# Patient Record
Sex: FEMALE | Race: WHITE | HISPANIC OR LATINO | Employment: FULL TIME | URBAN - METROPOLITAN AREA
[De-identification: names, ages, dates, MRNs, and addresses within clinical notes are randomized per-mention and may not be internally consistent; named-entity substitution may affect disease eponyms.]

---

## 2019-09-22 ENCOUNTER — TELEPHONE (OUTPATIENT)
Dept: SCHEDULING | Facility: IMAGING CENTER | Age: 24
End: 2019-09-22

## 2019-09-24 ENCOUNTER — TELEPHONE (OUTPATIENT)
Dept: MEDICAL GROUP | Facility: PHYSICIAN GROUP | Age: 24
End: 2019-09-24

## 2019-09-24 ENCOUNTER — OFFICE VISIT (OUTPATIENT)
Dept: MEDICAL GROUP | Facility: PHYSICIAN GROUP | Age: 24
End: 2019-09-24
Payer: COMMERCIAL

## 2019-09-24 ENCOUNTER — APPOINTMENT (OUTPATIENT)
Dept: MEDICAL GROUP | Facility: MEDICAL CENTER | Age: 24
End: 2019-09-24
Payer: COMMERCIAL

## 2019-09-24 VITALS
DIASTOLIC BLOOD PRESSURE: 64 MMHG | HEIGHT: 64 IN | BODY MASS INDEX: 32.44 KG/M2 | OXYGEN SATURATION: 97 % | HEART RATE: 98 BPM | TEMPERATURE: 98.3 F | WEIGHT: 190 LBS | SYSTOLIC BLOOD PRESSURE: 108 MMHG

## 2019-09-24 DIAGNOSIS — K59.00 CONSTIPATION, UNSPECIFIED CONSTIPATION TYPE: ICD-10-CM

## 2019-09-24 DIAGNOSIS — E66.9 CLASS 1 OBESITY WITH BODY MASS INDEX (BMI) OF 32.0 TO 32.9 IN ADULT, UNSPECIFIED OBESITY TYPE, UNSPECIFIED WHETHER SERIOUS COMORBIDITY PRESENT: ICD-10-CM

## 2019-09-24 DIAGNOSIS — E66.9 OBESITY (BMI 30-39.9): ICD-10-CM

## 2019-09-24 DIAGNOSIS — K21.9 GASTROESOPHAGEAL REFLUX DISEASE, ESOPHAGITIS PRESENCE NOT SPECIFIED: ICD-10-CM

## 2019-09-24 PROCEDURE — 99204 OFFICE O/P NEW MOD 45 MIN: CPT | Performed by: INTERNAL MEDICINE

## 2019-09-24 RX ORDER — POLYETHYLENE GLYCOL 3350 17 G/17G
17 POWDER, FOR SOLUTION ORAL DAILY
Qty: 1 EACH | Refills: 3 | Status: SHIPPED | OUTPATIENT
Start: 2019-09-24 | End: 2019-09-24

## 2019-09-24 RX ORDER — OMEPRAZOLE 20 MG/1
20 CAPSULE, DELAYED RELEASE ORAL DAILY
Qty: 30 CAP | Refills: 1 | Status: SHIPPED | OUTPATIENT
Start: 2019-09-24 | End: 2019-10-30

## 2019-09-24 RX ORDER — POLYETHYLENE GLYCOL 3350 17 G/17G
17 POWDER, FOR SOLUTION ORAL
Qty: 1 BOTTLE | Refills: 3 | Status: SHIPPED | OUTPATIENT
Start: 2019-09-24 | End: 2019-10-30

## 2019-09-24 ASSESSMENT — PATIENT HEALTH QUESTIONNAIRE - PHQ9: CLINICAL INTERPRETATION OF PHQ2 SCORE: 0

## 2019-09-24 NOTE — ASSESSMENT & PLAN NOTE
This is a new problem which patient states that its more than 3 years. She states that this is associated with bloating and mostly before food. Does have aggravating symptoms sometimes after dairy products. Pt denies any Nausea, no blood in stools.

## 2019-09-24 NOTE — PROGRESS NOTES
CC: Establish care with new PCP, stomach discomfort.    HISTORY OF PRESENT ILLNESS: Patient is a 23 y.o. female established patient who presents today to discuss on medical conditions as mentioned in HPI below.    Health Maintenance: Due for all the vaccines and healthcare maintenance, which patient refuses at this time.  Postponed to next visit.    GERD (gastroesophageal reflux disease)  This is a new problem which patient states that its more than 3 years. She states that this is associated with bloating and mostly before food. Does have aggravating symptoms sometimes after dairy products. Pt denies any Nausea, no blood in stools.    Constipation  This is a new problem which patient has on and off for few weeks which she tries to have BM once in 3 days.  Denies any diarrhea, no blood in the stools.  Does have left lower abdominal discomfort.      PHQ score 0, BMI within normal limits, no tobacco, no fall injuries.    Patient Active Problem List    Diagnosis Date Noted   • GERD (gastroesophageal reflux disease) 09/24/2019   • Constipation 09/24/2019   • Obesity (BMI 30-39.9) 09/24/2019      Allergies:Patient has no allergy information on record.    Current Outpatient Medications   Medication Sig Dispense Refill   • omeprazole (PRILOSEC) 20 MG delayed-release capsule Take 1 Cap by mouth every day. 30 Cap 1   • polyethylene glycol/lytes (MIRALAX) Pack Take 1 Packet by mouth every day. 1 Each 3     No current facility-administered medications for this visit.        Social History     Tobacco Use   • Smoking status: Light Tobacco Smoker   • Smokeless tobacco: Never Used   • Tobacco comment: once in a month    Substance Use Topics   • Alcohol use: Yes     Comment: occassionally once a week.   • Drug use: Never     Social History     Social History Narrative   • Not on file       Family History   Problem Relation Age of Onset   • Cancer Maternal Grandmother         ROS:     - Constitutional:  Negative for fever,  "chills, unexpected weight change, and fatigue/generalized weakness.    - HEENT:  Negative for headaches, vision changes, hearing changes, ear pain, ear discharge, rhinorrhea, sinus congestion, sore throat, and neck pain.      - Respiratory: Negative for cough, sputum production, chest congestion, dyspnea, wheezing, and crackles.      - Cardiovascular: Negative for chest pain, palpitations, orthopnea, and bilateral lower extremity edema.     - Gastrointestinal: As mentioned in HPI above     - Genitourinary: Negative for dysuria, polyuria, hematuria, pyuria, urinary urgency, and urinary incontinence.     - Musculoskeletal: Patient does have on and off left knee joint muscle cramps behind the knee as she sits in particular positions negative for myalgias, back pain.     - Skin: Negative for rash, itching, cyanotic skin color change.     - Neurological: Negative for dizziness, tingling, tremors, focal sensory deficit, focal weakness and headaches.     - Endo/Heme/Allergies: Does not bruise/bleed easily.     - Psychiatric/Behavioral: Negative for depression, suicidal/homicidal ideation and memory loss.      Last lab work never done for this patient, requesting all the baseline lab work.    Exam:    /64 (BP Location: Right arm, Patient Position: Sitting, BP Cuff Size: Adult)   Pulse 98   Temp 36.8 °C (98.3 °F) (Temporal)   Ht 1.626 m (5' 4\")   Wt 86.2 kg (190 lb)   SpO2 97%  Body mass index is 32.61 kg/m².    General:  Well nourished, well developed female in NAD  Head is grossly normal.  Neck: Supple without JVD or bruit. Thyroid is not enlarged.  Pulmonary: Clear to ausculation and percussion.  Normal effort. No rales, ronchi, or wheezing.  Cardiovascular: Regular rate and rhythm without murmur. Carotid and radial pulses are intact and equal bilaterally.  Extremities: no clubbing, cyanosis, or edema.  Abdominal exam : Soft, mild discomfort on palpation of the left lower quadrant.  No rigidity or " guarding.  Left knee exam : No swelling, erythema or tenderness on palpation.  It is only her subjective feeling whenever she sits in a particular position.      Please note that this dictation was created using voice recognition software. I have made every reasonable attempt to correct obvious errors, but I expect that there are errors of grammar and possibly content that I did not discover before finalizing the note.    Assessment/Plan:  1. Gastroesophageal reflux disease, esophagitis presence not specified  New problem, as mentioned in HPI above my physical exam findings we will treat her empirically with a PPI at this time.  Will check for CBC and CMP to make sure there is no pathology on this.  Given instructions on diet modifications which she understood and agreed.  - CBC WITH DIFFERENTIAL; Future  - Comp Metabolic Panel; Future  - omeprazole (PRILOSEC) 20 MG delayed-release capsule; Take 1 Cap by mouth every day.  Dispense: 30 Cap; Refill: 1    2. Constipation, unspecified constipation type  New problem, will give her empiric MiraLAX for symptom relief.  Advised on diet modification which can help her in relief of the symptoms.  Patient understood and agreed with the plan.  - polyethylene glycol/lytes (MIRALAX) Pack; Take 1 Packet by mouth every day.  Dispense: 1 Each; Refill: 3    3. Obesity (BMI 30-39.9)  4. Class 1 obesity with body mass index (BMI) of 32.0 to 32.9 in adult, unspecified obesity type, unspecified whether serious comorbidity present  - Patient identified as having weight management issue.  Appropriate orders and counseling given.  Patient requesting for help on her obesity, will check a thyroid function and also lipid panel given her comorbidities.  - Lipid Profile; Future  - TSH WITH REFLEX TO FT4; Future

## 2019-09-24 NOTE — ASSESSMENT & PLAN NOTE
This is a new problem which patient has on and off for few weeks which she tries to have BM once in 3 days.  Denies any diarrhea, no blood in the stools.  Does have left lower abdominal discomfort.

## 2019-09-24 NOTE — PATIENT INSTRUCTIONS
Quadriceps Strain Rehab  Ask your health care provider which exercises are safe for you. Do exercises exactly as told by your health care provider and adjust them as directed. It is normal to feel mild stretching, pulling, tightness, or discomfort as you do these exercises, but you should stop right away if you feel sudden pain or your pain gets worse. Do not begin these exercises until told by your health care provider.  Stretching and range of motion exercises  These exercises warm up your muscles and joints and improve the movement and flexibility of your thigh. These exercises can also help to relieve stiffness or swelling.  Exercise A: Heel slides  1. Lie on your back with both knees straight. If this causes back discomfort, bend the knee of your healthy leg, placing your foot flat on the floor.  2. Slowly slide your left / right heel back toward your buttocks until you feel a gentle stretch in the front of your knee or thigh.  3. Hold for __________ seconds. Then slowly slide your heel back to the starting position.  Repeat __________ times. Complete this exercise __________ times a day.  Exercise B: Quadriceps stretch, prone  1. Lie on your abdomen on a firm surface, such as a bed or padded floor.  2. Bend your left / right knee and hold your ankle. If you cannot reach your ankle or pant leg, loop a belt around your foot and grab the belt instead.  3. Gently pull your heel toward your buttocks. Your knee should not slide out to the side. You should feel a stretch in the front of your thigh and knee.  4. Hold this position for __________ seconds.  Repeat __________ times. Complete this exercise __________ times a day.  Strengthening exercises  These exercises build strength and endurance in your thigh. Endurance is the ability to use your muscles for a long time, even after your muscles get tired.  Exercise C: Straight leg raises (quadriceps and hip flexors)  Quality counts! Watch for signs that the quadriceps  muscle is working to ensure that you are strengthening the correct muscles and not cheating by using healthier muscles.  1. Lie on your back with your left / right leg extended and your other knee bent.  2. Tense the muscles in the front of your left / right thigh. You should see your kneecap slide up or see increased dimpling just above the knee.  3. Tighten these muscles even more and raise your leg 4-6 inches (10-15 cm) off the floor.  4. Hold for __________ seconds.  5. Keep the thigh muscles tense as you lower your leg.  6. Relax the muscles slowly and completely after each repetition.  Repeat __________ times. Complete this exercise __________ times a day.  Exercise D: Straight leg raises (hip extensors)  1. Lie on your belly on a bed or a firm surface with a pillow under your hips.  2. Bend your left / right knee so your foot is straight up in the air.  3. Tense your buttock muscles and lift your left / right thigh off the bed. Do not let your back arch.  4. Hold this position for __________ seconds.  5. Slowly return to the starting position. Let your muscles relax completely before doing another repetition.  Repeat __________ times. Complete this exercise __________ times a day.  Exercise E: Wall sits  Follow the directions for form closely. If you do not place your feet and knees properly, this can lead to knee pain.  1. Lean back against a smooth wall or door and walk your feet out 18-24 inches (46-61 cm) from it. Place your feet hip-width apart.  2. Slowly slide down the wall or door until your knees bend __________ degrees. Keep your weight back and over your heels, not over your toes. Keep your thighs straight or pointing slightly outward.  3. Hold for __________ seconds.  4. Use your thigh and buttock muscles to push you back up to a standing position. Keep your weight through your heels while you do this.  5. Rest for __________ seconds in between repetitions.  Repeat __________ times. Complete this  exercise __________ times a day.  This information is not intended to replace advice given to you by your health care provider. Make sure you discuss any questions you have with your health care provider.  Document Released: 12/18/2006 Document Revised: 08/24/2017 Document Reviewed: 09/20/2016  Elsevier Interactive Patient Education © 2017 [employer]

## 2019-10-18 ENCOUNTER — HOSPITAL ENCOUNTER (OUTPATIENT)
Dept: LAB | Facility: MEDICAL CENTER | Age: 24
End: 2019-10-18
Attending: INTERNAL MEDICINE
Payer: COMMERCIAL

## 2019-10-18 DIAGNOSIS — E66.9 OBESITY (BMI 30-39.9): ICD-10-CM

## 2019-10-18 DIAGNOSIS — K21.9 GASTROESOPHAGEAL REFLUX DISEASE, ESOPHAGITIS PRESENCE NOT SPECIFIED: ICD-10-CM

## 2019-10-18 LAB
ALBUMIN SERPL BCP-MCNC: 4.4 G/DL (ref 3.2–4.9)
ALBUMIN/GLOB SERPL: 1.5 G/DL
ALP SERPL-CCNC: 73 U/L (ref 30–99)
ALT SERPL-CCNC: 12 U/L (ref 2–50)
ANION GAP SERPL CALC-SCNC: 5 MMOL/L (ref 0–11.9)
AST SERPL-CCNC: 17 U/L (ref 12–45)
BASOPHILS # BLD AUTO: 0.5 % (ref 0–1.8)
BASOPHILS # BLD: 0.03 K/UL (ref 0–0.12)
BILIRUB SERPL-MCNC: 0.2 MG/DL (ref 0.1–1.5)
BUN SERPL-MCNC: 17 MG/DL (ref 8–22)
CALCIUM SERPL-MCNC: 9.3 MG/DL (ref 8.5–10.5)
CHLORIDE SERPL-SCNC: 107 MMOL/L (ref 96–112)
CHOLEST SERPL-MCNC: 171 MG/DL (ref 100–199)
CO2 SERPL-SCNC: 27 MMOL/L (ref 20–33)
CREAT SERPL-MCNC: 0.72 MG/DL (ref 0.5–1.4)
EOSINOPHIL # BLD AUTO: 0.05 K/UL (ref 0–0.51)
EOSINOPHIL NFR BLD: 0.8 % (ref 0–6.9)
ERYTHROCYTE [DISTWIDTH] IN BLOOD BY AUTOMATED COUNT: 45.9 FL (ref 35.9–50)
GLOBULIN SER CALC-MCNC: 3 G/DL (ref 1.9–3.5)
GLUCOSE SERPL-MCNC: 73 MG/DL (ref 65–99)
HCT VFR BLD AUTO: 38.4 % (ref 37–47)
HDLC SERPL-MCNC: 42 MG/DL
HGB BLD-MCNC: 11.7 G/DL (ref 12–16)
IMM GRANULOCYTES # BLD AUTO: 0.01 K/UL (ref 0–0.11)
IMM GRANULOCYTES NFR BLD AUTO: 0.2 % (ref 0–0.9)
LDLC SERPL CALC-MCNC: 116 MG/DL
LYMPHOCYTES # BLD AUTO: 2.76 K/UL (ref 1–4.8)
LYMPHOCYTES NFR BLD: 43.1 % (ref 22–41)
MCH RBC QN AUTO: 24.5 PG (ref 27–33)
MCHC RBC AUTO-ENTMCNC: 30.5 G/DL (ref 33.6–35)
MCV RBC AUTO: 80.3 FL (ref 81.4–97.8)
MONOCYTES # BLD AUTO: 0.42 K/UL (ref 0–0.85)
MONOCYTES NFR BLD AUTO: 6.6 % (ref 0–13.4)
NEUTROPHILS # BLD AUTO: 3.13 K/UL (ref 2–7.15)
NEUTROPHILS NFR BLD: 48.8 % (ref 44–72)
NRBC # BLD AUTO: 0 K/UL
NRBC BLD-RTO: 0 /100 WBC
PLATELET # BLD AUTO: 331 K/UL (ref 164–446)
PMV BLD AUTO: 11.6 FL (ref 9–12.9)
POTASSIUM SERPL-SCNC: 4.2 MMOL/L (ref 3.6–5.5)
PROT SERPL-MCNC: 7.4 G/DL (ref 6–8.2)
RBC # BLD AUTO: 4.78 M/UL (ref 4.2–5.4)
SODIUM SERPL-SCNC: 139 MMOL/L (ref 135–145)
TRIGL SERPL-MCNC: 67 MG/DL (ref 0–149)
TSH SERPL DL<=0.005 MIU/L-ACNC: 4.48 UIU/ML (ref 0.38–5.33)
WBC # BLD AUTO: 6.4 K/UL (ref 4.8–10.8)

## 2019-10-18 PROCEDURE — 85025 COMPLETE CBC W/AUTO DIFF WBC: CPT

## 2019-10-18 PROCEDURE — 80053 COMPREHEN METABOLIC PANEL: CPT

## 2019-10-18 PROCEDURE — 80061 LIPID PANEL: CPT

## 2019-10-18 PROCEDURE — 84443 ASSAY THYROID STIM HORMONE: CPT

## 2019-10-18 PROCEDURE — 36415 COLL VENOUS BLD VENIPUNCTURE: CPT

## 2019-10-19 DIAGNOSIS — D50.9 IRON DEFICIENCY ANEMIA, UNSPECIFIED IRON DEFICIENCY ANEMIA TYPE: ICD-10-CM

## 2019-10-19 RX ORDER — FERROUS SULFATE 325(65) MG
325 TABLET ORAL DAILY
Qty: 90 TAB | Refills: 1 | Status: SHIPPED | OUTPATIENT
Start: 2019-10-19

## 2019-10-30 ENCOUNTER — HOSPITAL ENCOUNTER (OUTPATIENT)
Dept: LAB | Facility: MEDICAL CENTER | Age: 24
End: 2019-10-30
Attending: INTERNAL MEDICINE
Payer: COMMERCIAL

## 2019-10-30 ENCOUNTER — OFFICE VISIT (OUTPATIENT)
Dept: MEDICAL GROUP | Facility: PHYSICIAN GROUP | Age: 24
End: 2019-10-30
Payer: COMMERCIAL

## 2019-10-30 VITALS
SYSTOLIC BLOOD PRESSURE: 110 MMHG | TEMPERATURE: 98.5 F | WEIGHT: 191 LBS | DIASTOLIC BLOOD PRESSURE: 64 MMHG | BODY MASS INDEX: 32.61 KG/M2 | HEIGHT: 64 IN | OXYGEN SATURATION: 95 % | HEART RATE: 64 BPM

## 2019-10-30 DIAGNOSIS — Z23 NEED FOR VACCINATION: ICD-10-CM

## 2019-10-30 DIAGNOSIS — E66.9 CLASS 1 OBESITY WITH BODY MASS INDEX (BMI) OF 32.0 TO 32.9 IN ADULT, UNSPECIFIED OBESITY TYPE, UNSPECIFIED WHETHER SERIOUS COMORBIDITY PRESENT: ICD-10-CM

## 2019-10-30 DIAGNOSIS — D64.9 ANEMIA, UNSPECIFIED TYPE: ICD-10-CM

## 2019-10-30 DIAGNOSIS — E66.9 OBESITY (BMI 30-39.9): ICD-10-CM

## 2019-10-30 DIAGNOSIS — R10.2 PELVIC PAIN IN FEMALE: ICD-10-CM

## 2019-10-30 DIAGNOSIS — Z71.6 ENCOUNTER FOR SMOKING CESSATION COUNSELING: ICD-10-CM

## 2019-10-30 DIAGNOSIS — Z72.0 TOBACCO USE: ICD-10-CM

## 2019-10-30 DIAGNOSIS — N63.11 LUMP IN UPPER OUTER QUADRANT OF RIGHT BREAST: ICD-10-CM

## 2019-10-30 LAB
FERRITIN SERPL-MCNC: 2.7 NG/ML (ref 10–291)
FOLATE SERPL-MCNC: 8.1 NG/ML
IRON SATN MFR SERPL: 7 % (ref 15–55)
IRON SERPL-MCNC: 23 UG/DL (ref 40–170)
TIBC SERPL-MCNC: 346 UG/DL (ref 250–450)
VIT B12 SERPL-MCNC: 450 PG/ML (ref 211–911)

## 2019-10-30 PROCEDURE — 99214 OFFICE O/P EST MOD 30 MIN: CPT | Mod: 25 | Performed by: INTERNAL MEDICINE

## 2019-10-30 PROCEDURE — 90686 IIV4 VACC NO PRSV 0.5 ML IM: CPT | Performed by: INTERNAL MEDICINE

## 2019-10-30 PROCEDURE — 82607 VITAMIN B-12: CPT

## 2019-10-30 PROCEDURE — 83540 ASSAY OF IRON: CPT

## 2019-10-30 PROCEDURE — 90471 IMMUNIZATION ADMIN: CPT | Mod: 59 | Performed by: INTERNAL MEDICINE

## 2019-10-30 PROCEDURE — 82746 ASSAY OF FOLIC ACID SERUM: CPT

## 2019-10-30 PROCEDURE — 36415 COLL VENOUS BLD VENIPUNCTURE: CPT

## 2019-10-30 PROCEDURE — 90472 IMMUNIZATION ADMIN EACH ADD: CPT | Performed by: INTERNAL MEDICINE

## 2019-10-30 PROCEDURE — 90715 TDAP VACCINE 7 YRS/> IM: CPT | Performed by: INTERNAL MEDICINE

## 2019-10-30 PROCEDURE — 99406 BEHAV CHNG SMOKING 3-10 MIN: CPT | Mod: 25 | Performed by: INTERNAL MEDICINE

## 2019-10-30 PROCEDURE — 83550 IRON BINDING TEST: CPT

## 2019-10-30 PROCEDURE — 82728 ASSAY OF FERRITIN: CPT

## 2019-10-30 RX ORDER — DICYCLOMINE HYDROCHLORIDE 10 MG/1
10 CAPSULE ORAL 3 TIMES DAILY PRN
Qty: 30 CAP | Refills: 1 | Status: SHIPPED | OUTPATIENT
Start: 2019-10-30

## 2019-10-30 NOTE — ASSESSMENT & PLAN NOTE
This is a new problem started 1 year back, denies any vaginal discharge and intermittently has clear vaginal discharge.  Patient mentions that she has a female partner.

## 2019-10-30 NOTE — PROGRESS NOTES
CC: Short visit, pelvic pain.    HISTORY OF PRESENT ILLNESS: Patient is a 24 y.o. female established patient who presents today to discuss her medical conditions as mentioned in HPI below.    Health Maintenance: Due for flu vaccine, tetanus vaccine, pneumonia vaccine okay to get in the office today.  Not willing for varicella shot at this time as she feels that she has gotten chickenpox as a child.    Pelvic pain in female  This is a new problem started 1 year back, denies any vaginal discharge and intermittently has clear vaginal discharge.    Lump in upper outer quadrant of right breast  This is a new problem which patient has been seeing for last 10 yrs back and also she has seen it growing in size. Denies any pain unless she presses on it.      PHQ score 0, BMI > 32 , no tobacco, no fall injuries.    Patient Active Problem List    Diagnosis Date Noted   • Pelvic pain in female 10/30/2019   • Lump in upper outer quadrant of right breast 10/30/2019   • GERD (gastroesophageal reflux disease) 09/24/2019   • Constipation 09/24/2019   • Obesity (BMI 30-39.9) 09/24/2019      Allergies:Patient has no allergy information on record.    Current Outpatient Medications   Medication Sig Dispense Refill   • dicyclomine (BENTYL) 10 MG Cap Take 1 Cap by mouth 3 times a day as needed. 30 Cap 1   • ferrous sulfate 325 (65 Fe) MG tablet Take 1 Tab by mouth every day. (Patient not taking: Reported on 10/30/2019) 90 Tab 1     No current facility-administered medications for this visit.        Social History     Tobacco Use   • Smoking status: Light Tobacco Smoker   • Smokeless tobacco: Never Used   • Tobacco comment: once in a month    Substance Use Topics   • Alcohol use: Yes     Comment: occassionally once a week.   • Drug use: Never     Social History     Social History Narrative   • Not on file       Family History   Problem Relation Age of Onset   • Cancer Maternal Grandmother         ROS:     - Constitutional:  Negative for  "fever, chills, unexpected weight change, and fatigue/generalized weakness.    - HEENT:  Negative for headaches, vision changes, hearing changes, ear pain, ear discharge, rhinorrhea, sinus congestion, sore throat, and neck pain.      - Respiratory: Negative for cough, sputum production, chest congestion, dyspnea, wheezing, and crackles.      - Cardiovascular: Negative for chest pain, palpitations, orthopnea, and bilateral lower extremity edema.     - Gastrointestinal: Negative for heartburn, nausea, vomiting, abdominal pain, hematochezia, melena, diarrhea, constipation, and greasy/foul-smelling stools.     - Genitourinary: Negative for dysuria, polyuria, hematuria, pyuria, urinary urgency, and urinary incontinence.     - Musculoskeletal: Negative for myalgias, back pain, and joint pain.     - Skin: Negative for rash, itching, cyanotic skin color change.     - Neurological: Negative for dizziness, tingling, tremors, focal sensory deficit, focal weakness and headaches.     - Endo/Heme/Allergies: Does not bruise/bleed easily.     - Psychiatric/Behavioral: Negative for depression, suicidal/homicidal ideation and memory loss.      Last lab work in October 2019 reviewed and discussed the patient.    Exam:    /64 (BP Location: Right arm, Patient Position: Sitting, BP Cuff Size: Adult)   Pulse 64   Temp 36.9 °C (98.5 °F) (Temporal)   Ht 1.626 m (5' 4\")   Wt 86.6 kg (191 lb)   SpO2 95%  Body mass index is 32.79 kg/m².    General:  Well nourished, well developed female in NAD  Head is grossly normal.  Neck: Supple without JVD or bruit. Thyroid is not enlarged.  Pulmonary: Clear to ausculation and percussion.  Normal effort. No rales, ronchi, or wheezing.  Cardiovascular: Regular rate and rhythm without murmur. Carotid and radial pulses are intact and equal bilaterally.  Extremities: no clubbing, cyanosis, or edema.  Breast exam : Positive for right breast lump in the upper outer quadrant measuring around oval, 3 cm " in size, no tenderness on palpation, no axilla lymphadenopathy.  Abdominal exam : Soft, nontender, no organomegaly.    Please note that this dictation was created using voice recognition software. I have made every reasonable attempt to correct obvious errors, but I expect that there are errors of grammar and possibly content that I did not discover before finalizing the note.    Assessment/Plan:  1. Pelvic pain in female  New problem, as mentioned in HPI above my physical exam findings possible ovarian pathology as discussed with the patient.  Will give symptom medic treatment and do ultrasound transvaginal.  - US-PELVIC TRANSVAGINAL ONLY; Future  - dicyclomine (BENTYL) 10 MG Cap; Take 1 Cap by mouth 3 times a day as needed.  Dispense: 30 Cap; Refill: 1    2. Lump in upper outer quadrant of right breast  New problem, most likely looks like axillary tail of Bolanos explained to the patient with [facility].  We will do an ultrasound of the breast, patient requesting for plastic surgery for correction of this.  - US-LOCALIZATION BREAST SINGLE RIGHT; Future    3. Obesity (BMI 30-39.9)  4. Class 1 obesity with body mass index (BMI) of 32.0 to 32.9 in adult, unspecified obesity type, unspecified whether serious comorbidity present  Patient identified as elevated BMI, appropriate counseling given.    5. Anemia, unspecified type  Uncontrolled, recent hemoglobin at 11.7.  With low MCV patient was in regular started on iron which patient was not ready to take unless the iron is low.  Will do the iron panel and make sure it is confirmed before she starts off again.  All the risks understood.  - IRON/TOTAL IRON BIND; Future  - FOLATE; Future  - VITAMIN B12; Future  - FERRITIN; Future    6. Need for vaccination  - PneumoVax PPV23 =>1yo  - Tdap =>8yo IM  - Influenza Vaccine Quad Injection (PF)    7. Encounter for smoking cessation counseling  8. Tobacco use  Patient was counseled on smoking cessation, we discussed the benefits  of quitting including decrease risk of MI by 50% after one year of cessation, decreased risk of lung cancer after 12 years, one cigarette is enough to paralyze cilia for 24 hours leading to increase risk of pulmonary infection, etc.... .Also encouraged to set a stop date.  This took more than 3 minutes duration.

## 2021-05-09 ENCOUNTER — OFFICE VISIT (OUTPATIENT)
Dept: URGENT CARE | Facility: CLINIC | Age: 26
End: 2021-05-09
Payer: COMMERCIAL

## 2021-05-09 VITALS
WEIGHT: 184.4 LBS | OXYGEN SATURATION: 99 % | SYSTOLIC BLOOD PRESSURE: 130 MMHG | HEIGHT: 64 IN | RESPIRATION RATE: 16 BRPM | HEART RATE: 91 BPM | BODY MASS INDEX: 31.48 KG/M2 | DIASTOLIC BLOOD PRESSURE: 78 MMHG | TEMPERATURE: 98.6 F

## 2021-05-09 DIAGNOSIS — S91.209A AVULSION OF TOENAIL, INITIAL ENCOUNTER: ICD-10-CM

## 2021-05-09 PROCEDURE — 99203 OFFICE O/P NEW LOW 30 MIN: CPT | Performed by: PHYSICIAN ASSISTANT

## 2021-05-09 ASSESSMENT — ENCOUNTER SYMPTOMS: ROS SKIN COMMENTS: TOENAIL INJURY

## 2021-05-09 ASSESSMENT — FIBROSIS 4 INDEX: FIB4 SCORE: 0.39

## 2021-05-09 NOTE — LETTER
May 9, 2021         Patient: Patricia Villegas   YOB: 1995   Date of Visit: 5/9/2021           To Whom it May Concern:    Patricia Villegas was seen in my clinic on 5/9/2021. She may return to work on Thurs. May 13th.    If you have any questions or concerns, please don't hesitate to call.        Sincerely,           Scotty Sheriff P.A.-C.  Electronically Signed

## 2021-05-10 ASSESSMENT — ENCOUNTER SYMPTOMS
NAUSEA: 0
JOINT SWELLING: 0
FATIGUE: 0
CHILLS: 0
FEVER: 0

## 2021-05-10 NOTE — PROGRESS NOTES
Subjective:      Patricia Villegas is a 25 y.o. female who presents with Toe Pain (x 4 days, opened door and took off part of toe nail. Painful to bear weight. )            Right second toenail avulsion 4 days ago.  Opened a door and caught her toe.  She went to the ER and had a negative x-ray.  She was given a antibiotic ointment.  Her tetanus is up-to-date.  She is here for recheck to ensure there is no infection.  She denies fever, increasing pain, redness or discharge.    Foot Problem  This is a new problem. The current episode started in the past 7 days. The problem occurs constantly. The problem has been unchanged. Pertinent negatives include no chills, fatigue, fever, joint swelling, nausea or rash. Nothing aggravates the symptoms. She has tried nothing for the symptoms. The treatment provided no relief.       PMH:  has no past medical history on file.  MEDS:   Current Outpatient Medications:   •  Ibuprofen (ADVIL PO), Take  by mouth., Disp: , Rfl:   •  Acetaminophen (TYLENOL 8 HOUR PO), Take  by mouth., Disp: , Rfl:   •  dicyclomine (BENTYL) 10 MG Cap, Take 1 Cap by mouth 3 times a day as needed. (Patient not taking: Reported on 5/9/2021), Disp: 30 Cap, Rfl: 1  •  ferrous sulfate 325 (65 Fe) MG tablet, Take 1 Tab by mouth every day. (Patient not taking: Reported on 10/30/2019), Disp: 90 Tab, Rfl: 1  ALLERGIES: No Known Allergies  SURGHX: No past surgical history on file.  SOCHX:  reports that she has been smoking. She has never used smokeless tobacco. She reports current alcohol use. She reports that she does not use drugs.  FH: family history includes Cancer in her maternal grandmother.    Review of Systems   Constitutional: Negative for chills, fatigue and fever.   Gastrointestinal: Negative for nausea.   Musculoskeletal: Negative for joint swelling.   Skin: Negative for rash.        Toenail injury         Medications, Allergies, and current problem list reviewed today in Epic     Objective:     BP  "130/78 (BP Location: Right arm, Patient Position: Sitting, BP Cuff Size: Adult)   Pulse 91   Temp 37 °C (98.6 °F) (Temporal)   Resp 16   Ht 1.626 m (5' 4\")   Wt 83.6 kg (184 lb 6.4 oz)   SpO2 99%   BMI 31.65 kg/m²      Physical Exam  Vitals and nursing note reviewed.   Constitutional:       General: She is not in acute distress.     Appearance: Normal appearance. She is well-developed. She is not ill-appearing or toxic-appearing.   HENT:      Head: Normocephalic and atraumatic.      Right Ear: External ear normal.      Left Ear: External ear normal.      Nose: Nose normal.   Eyes:      Conjunctiva/sclera: Conjunctivae normal.   Pulmonary:      Effort: Pulmonary effort is normal.   Musculoskeletal:        Feet:       Comments: Right second toenail avulsion.  Some surrounding tenderness.  No erythema, discharge.  No joint pain.  Range of motion normal.  Distal neurovascular intact.  No signs of infection.   Neurological:      Mental Status: She is alert and oriented to person, place, and time.   Psychiatric:         Mood and Affect: Mood normal.         Behavior: Behavior normal.         Thought Content: Thought content normal.         Judgment: Judgment normal.                 Assessment/Plan:         1. Avulsion of toenail, initial encounter       Well-healing wound.  No signs of infection.  Vital signs normal.  Wound care discussed.  Signs of infection discussed.  Tetanus is up-to-date.  Note for work provided.  Continue current treatment plan.  OTC meds and conservative measures as discussed    Return to clinic or go to ED if symptoms worsen or persist. Indications for ED discussed at length. Patient/Parent/Guardian voices understanding. Follow-up with your primary care provider in 3-5 days. Red flag symptoms discussed. All side effects of medication discussed including allergic response, GI upset, tendon injury, rash, sedation etc.    Please note that this dictation was created using voice recognition " software. I have made every reasonable attempt to correct obvious errors, but I expect that there are errors of grammar and possibly content that I did not discover before finalizing the note.

## 2025-02-26 NOTE — ASSESSMENT & PLAN NOTE
At last visit Dr Saleem ordered a nebulizer for the patient daughter called in stating she never received a nebulizer    Patient has been doing vape with nicotine occasionally as per her around 1-2 times every month.